# Patient Record
Sex: FEMALE | Race: WHITE | NOT HISPANIC OR LATINO | Employment: UNEMPLOYED | URBAN - METROPOLITAN AREA
[De-identification: names, ages, dates, MRNs, and addresses within clinical notes are randomized per-mention and may not be internally consistent; named-entity substitution may affect disease eponyms.]

---

## 2024-03-25 ENCOUNTER — OFFICE VISIT (OUTPATIENT)
Dept: ENDOCRINOLOGY | Facility: CLINIC | Age: 74
End: 2024-03-25
Payer: MEDICARE

## 2024-03-25 VITALS
DIASTOLIC BLOOD PRESSURE: 70 MMHG | BODY MASS INDEX: 34.16 KG/M2 | HEART RATE: 65 BPM | OXYGEN SATURATION: 99 % | SYSTOLIC BLOOD PRESSURE: 130 MMHG | WEIGHT: 174 LBS | HEIGHT: 60 IN

## 2024-03-25 DIAGNOSIS — Z78.0 POST-MENOPAUSAL: ICD-10-CM

## 2024-03-25 DIAGNOSIS — E21.0 PRIMARY HYPERPARATHYROIDISM: Primary | ICD-10-CM

## 2024-03-25 DIAGNOSIS — E83.52 HYPERCALCEMIA: ICD-10-CM

## 2024-03-25 LAB
25(OH)D3 SERPL-MCNC: 37.6 NG/ML (ref 30–100)
ALBUMIN SERPL-MCNC: 4.4 G/DL (ref 3.5–5.2)
ALBUMIN/GLOB SERPL: 1.5 G/DL
ALP SERPL-CCNC: 83 U/L (ref 39–117)
ALT SERPL W P-5'-P-CCNC: 20 U/L (ref 1–33)
ANION GAP SERPL CALCULATED.3IONS-SCNC: 10.9 MMOL/L (ref 5–15)
AST SERPL-CCNC: 18 U/L (ref 1–32)
BILIRUB SERPL-MCNC: 0.4 MG/DL (ref 0–1.2)
BUN SERPL-MCNC: 19 MG/DL (ref 8–23)
BUN/CREAT SERPL: 27.9 (ref 7–25)
CA-I BLD-MCNC: 5.8 MG/DL (ref 4.6–5.4)
CA-I SERPL ISE-MCNC: 1.45 MMOL/L (ref 1.15–1.35)
CALCIUM SPEC-SCNC: 10.8 MG/DL (ref 8.6–10.5)
CHLORIDE SERPL-SCNC: 102 MMOL/L (ref 98–107)
CO2 SERPL-SCNC: 25.1 MMOL/L (ref 22–29)
CREAT SERPL-MCNC: 0.68 MG/DL (ref 0.57–1)
EGFRCR SERPLBLD CKD-EPI 2021: 92.7 ML/MIN/1.73
GLOBULIN UR ELPH-MCNC: 2.9 GM/DL
GLUCOSE SERPL-MCNC: 132 MG/DL (ref 65–99)
POTASSIUM SERPL-SCNC: 4.1 MMOL/L (ref 3.5–5.2)
PROT SERPL-MCNC: 7.3 G/DL (ref 6–8.5)
PTH-INTACT SERPL-MCNC: 94.8 PG/ML (ref 15–65)
SODIUM SERPL-SCNC: 138 MMOL/L (ref 136–145)

## 2024-03-25 PROCEDURE — 99203 OFFICE O/P NEW LOW 30 MIN: CPT | Performed by: PHYSICIAN ASSISTANT

## 2024-03-25 PROCEDURE — 82330 ASSAY OF CALCIUM: CPT | Performed by: PHYSICIAN ASSISTANT

## 2024-03-25 PROCEDURE — 83970 ASSAY OF PARATHORMONE: CPT | Performed by: PHYSICIAN ASSISTANT

## 2024-03-25 PROCEDURE — 82306 VITAMIN D 25 HYDROXY: CPT | Performed by: PHYSICIAN ASSISTANT

## 2024-03-25 PROCEDURE — 80053 COMPREHEN METABOLIC PANEL: CPT | Performed by: PHYSICIAN ASSISTANT

## 2024-03-25 RX ORDER — ARIPIPRAZOLE 2 MG/1
1 TABLET ORAL DAILY
COMMUNITY
Start: 2024-02-24

## 2024-03-25 RX ORDER — LEVOCETIRIZINE DIHYDROCHLORIDE 5 MG/1
5 TABLET, FILM COATED ORAL EVERY EVENING
COMMUNITY
Start: 2024-02-29

## 2024-03-25 RX ORDER — LEVOTHYROXINE SODIUM 88 UG/1
88 TABLET ORAL
COMMUNITY
Start: 2023-11-01

## 2024-03-25 RX ORDER — ACYCLOVIR 400 MG/1
TABLET ORAL
COMMUNITY
Start: 2024-02-22

## 2024-03-25 RX ORDER — METFORMIN HYDROCHLORIDE 500 MG/1
500 TABLET, EXTENDED RELEASE ORAL
COMMUNITY
Start: 2024-03-11

## 2024-03-25 RX ORDER — ESCITALOPRAM OXALATE 10 MG/1
10 TABLET ORAL EVERY MORNING
COMMUNITY
Start: 2024-02-29

## 2024-03-25 NOTE — PROGRESS NOTES
Chief Complaint   Patient presents with    Abnormal Calcium     Elevated PTH       Subjective     MEGHNA Conrad is a 74 y.o. female.        History of Present Illness     Labs were checked in 11/2023 and had elevated calcium and then elevated PTH. Was not on calcium, was taking vitamin D off and on. After abnormal labs, started on vitamin D 50,000 units weekly. She is taking this regularly.    Has depression, on abilify and lexapro; struggled with it most of her life.No abdominal pain.  Denies kidney stones or fractures     Last DEXA never done per patient   No known family history of hypercalcemia     Has been on levothyroxine for hypothyroidism for years. No history of thyroid nodules.    Has diabetes and uses Dexcom. Has been on Metformin twice a day for about a year.        Current Outpatient Medications:     ARIPiprazole (ABILIFY) 2 MG tablet, Take 1 tablet by mouth Daily., Disp: , Rfl:     cholecalciferol (VITAMIN D3) 1.25 MG (24772 UT) capsule, Take 1 capsule by mouth Every 7 (Seven) Days. Patient takes every Monday, Disp: , Rfl:     Continuous Blood Gluc Sensor (Dexcom G7 Sensor) misc, , Disp: , Rfl:     escitalopram (LEXAPRO) 10 MG tablet, Take 1 tablet by mouth Every Morning., Disp: , Rfl:     levocetirizine (XYZAL) 5 MG tablet, Take 1 tablet by mouth Every Evening., Disp: , Rfl:     levothyroxine (SYNTHROID, LEVOTHROID) 88 MCG tablet, Take 1 tablet by mouth Every Morning., Disp: , Rfl:     metFORMIN ER (GLUCOPHAGE-XR) 500 MG 24 hr tablet, Take 1 tablet by mouth Daily With Breakfast., Disp: , Rfl:     metoprolol tartrate (LOPRESSOR) 25 MG tablet, Take 1 tablet by mouth Daily., Disp: , Rfl:     polyethyl glycol-propyl glycol (SYSTANE) 0.4-0.3 % solution ophthalmic solution (artificial tears), Administer 2 drops to both eyes Every 1 (One) Hour As Needed., Disp: , Rfl:      PMFSH  The following portions of the patient's history were reviewed and updated as appropriate: allergies, current medications, past  "family history, past medical history, past social history, past surgical history, and problem list.    Review of Systems   Constitutional:  Negative for fatigue.   Respiratory:  Negative for chest tightness.    Cardiovascular:  Negative for chest pain.   Gastrointestinal:  Negative for abdominal pain, constipation and diarrhea.   Musculoskeletal:  Positive for arthralgias.   Neurological:  Negative for weakness.   Psychiatric/Behavioral:  Positive for dysphoric mood. The patient is not nervous/anxious.        Objective   /70 (BP Location: Left arm, Patient Position: Sitting, Cuff Size: Adult)   Pulse 65   Ht 152.4 cm (60\")   Wt 78.9 kg (174 lb)   SpO2 99%   BMI 33.98 kg/m²     Physical Exam  Vitals and nursing note reviewed.   Constitutional:       Appearance: She is well-developed.   HENT:      Head: Normocephalic and atraumatic.   Eyes:      Conjunctiva/sclera: Conjunctivae normal.   Neck:      Thyroid: No thyroid mass, thyromegaly or thyroid tenderness.   Cardiovascular:      Rate and Rhythm: Normal rate and regular rhythm.   Pulmonary:      Effort: Pulmonary effort is normal.      Breath sounds: Normal breath sounds.   Musculoskeletal:         General: Normal range of motion.      Cervical back: Normal range of motion.   Skin:     General: Skin is warm and dry.   Psychiatric:         Behavior: Behavior normal.         2/2824  Calcium 10.9  Vitamin D, 25-hydroxy 36.2  PTH 58         ASSESSMENT/PLAN    Diagnoses and all orders for this visit:    1. Primary hyperparathyroidism (Primary)  Assessment & Plan:  Given that patient is asymptomatic and over age 55, will need to gather more information to determine the recommended management. Will recheck CMP, ionized calcium, PTH and vitamin D today.  Order DEXA scan to determine whether she has osteoporosis. Will also need 24 hour urinary calcium. If kidney function, bone density remain within normal limits, serum calcium is less than 1 point above normal " range and patient is asymptomatic, can continue to monitor levels every 6 months-1 year.    Orders:  -     dexa bone density axial; Future  -     Calcium, Urine, 24 Hour - Urine, Clean Catch; Future    2. Hypercalcemia  -     Calcium, Ionized; Future  -     PTH, Intact; Future  -     Vitamin D 25 Hydroxy; Future  -     Comprehensive Metabolic Panel; Future  -     Calcium, Ionized  -     PTH, Intact  -     Vitamin D 25 Hydroxy  -     Comprehensive Metabolic Panel    3. Post-menopausal  -     dexa bone density axial; Future             Return in about 5 months (around 8/25/2024) for Follow up.

## 2024-03-26 PROBLEM — E21.0 PRIMARY HYPERPARATHYROIDISM: Status: ACTIVE | Noted: 2024-03-26

## 2024-03-26 NOTE — ASSESSMENT & PLAN NOTE
Given that patient is asymptomatic and over age 55, will need to gather more information to determine the recommended management. Will recheck CMP, ionized calcium, PTH and vitamin D today.  Order DEXA scan to determine whether she has osteoporosis. Will also need 24 hour urinary calcium. If kidney function, bone density remain within normal limits, serum calcium is less than 1 point above normal range and patient is asymptomatic, can continue to monitor levels every 6 months-1 year.

## 2024-03-27 NOTE — PROGRESS NOTES
Your PTH level is still elevated but your calcium remains about the same.     Your vitamin D is within normal range, so please continue your current dose of vitamin D.    I have ordered the bone density scan and someone will call you to schedule the test.    We also need to collect a 24 hour urine sample to check the urine calcium. I have ordered this and you can stop by any of the [facility] labs to  the supplies for collection. Please let us know if you have any questions about this.

## 2024-04-02 ENCOUNTER — TELEPHONE (OUTPATIENT)
Dept: ENDOCRINOLOGY | Facility: CLINIC | Age: 74
End: 2024-04-02
Payer: MEDICARE

## 2024-04-02 NOTE — TELEPHONE ENCOUNTER
LVM for patient to view [employer] messages for lab results and/or contact the office for information.

## 2024-04-04 ENCOUNTER — TELEPHONE (OUTPATIENT)
Dept: ENDOCRINOLOGY | Facility: CLINIC | Age: 74
End: 2024-04-04
Payer: MEDICARE

## 2024-04-04 NOTE — TELEPHONE ENCOUNTER
SPOKE WITH [facility] REGARDING PATIENT'S RECENT LAB RESULTS. ADVISED PER CHART MESSAGE BY CARLOS QUINTERO, MAILING LAB ORDER TO PATIENT SO THAT SHE CAN HAVE THIS DONE LOCALLY.  [facility]LR[facility] VERBALIZED UNDERSTANDING AND WILL CALL WITH ANY FUTURE QUESTIONS REGARDING LAB ORDERS.

## 2024-05-28 ENCOUNTER — HOSPITAL ENCOUNTER (OUTPATIENT)
Dept: BONE DENSITY | Facility: HOSPITAL | Age: 74
Discharge: HOME | End: 2024-05-28
Admitting: PHYSICIAN ASSISTANT
Payer: MEDICARE

## 2024-05-28 DIAGNOSIS — E21.0 PRIMARY HYPERPARATHYROIDISM: ICD-10-CM

## 2024-05-28 DIAGNOSIS — Z78.0 POST-MENOPAUSAL: ICD-10-CM

## 2024-05-28 PROCEDURE — 77080 DXA BONE DENSITY AXIAL: CPT

## 2024-09-04 ENCOUNTER — OFFICE VISIT (OUTPATIENT)
Dept: ENDOCRINOLOGY | Facility: CLINIC | Age: 74
End: 2024-09-04
Payer: MEDICARE

## 2024-09-04 VITALS
DIASTOLIC BLOOD PRESSURE: 70 MMHG | HEART RATE: 94 BPM | SYSTOLIC BLOOD PRESSURE: 118 MMHG | BODY MASS INDEX: 35.14 KG/M2 | OXYGEN SATURATION: 97 % | HEIGHT: 60 IN | WEIGHT: 179 LBS

## 2024-09-04 DIAGNOSIS — E21.0 PRIMARY HYPERPARATHYROIDISM: Primary | ICD-10-CM

## 2024-09-04 PROCEDURE — 99213 OFFICE O/P EST LOW 20 MIN: CPT | Performed by: PHYSICIAN ASSISTANT

## 2024-09-04 PROCEDURE — 80053 COMPREHEN METABOLIC PANEL: CPT | Performed by: PHYSICIAN ASSISTANT

## 2024-09-04 PROCEDURE — 1160F RVW MEDS BY RX/DR IN RCRD: CPT | Performed by: PHYSICIAN ASSISTANT

## 2024-09-04 PROCEDURE — 1159F MED LIST DOCD IN RCRD: CPT | Performed by: PHYSICIAN ASSISTANT

## 2024-09-04 PROCEDURE — 83970 ASSAY OF PARATHORMONE: CPT | Performed by: PHYSICIAN ASSISTANT

## 2024-09-04 NOTE — PROGRESS NOTES
Chief Complaint   Patient presents with    Thyroid Problem     Hyperparathyroidism    Abnormal Lab     Calcium in blood       Subjective            History of Present Illness     Jami Conrad is a 74 y.o. female here for follow up on hyperparathyroidism. Since last visit, she has had DEXA that showed osteopenia. Calcium was stable at 10.8 at last visit. Vitamin D in normal range. Renal function normal. She has not yet completed the 24 hour urine calcium. Due to age and asymptomatic status, we are monitoring calcium levels. If Calcium is above 11.5 will need to consider surgical treatment.  Continues to be asymptomatic with no new fatigue or memory changes. No history of kidney stones or muscular complaints.          Current Outpatient Medications:     ARIPiprazole (ABILIFY) 2 MG tablet, Take 1 tablet by mouth Daily. Refills to [facility], Disp: , Rfl:     cholecalciferol (VITAMIN D3) 1.25 MG (82337 UT) capsule, Take 1 capsule by mouth Every 7 (Seven) Days. Patient takes every Monday, Disp: , Rfl:     Continuous Blood Gluc Sensor (Dexcom G7 Sensor) misc, , Disp: , Rfl:     escitalopram (LEXAPRO) 10 MG tablet, Take 1 tablet by mouth Every Morning., Disp: , Rfl:     levocetirizine (XYZAL) 5 MG tablet, Take 1 tablet by mouth Every Evening., Disp: , Rfl:     levothyroxine (SYNTHROID, LEVOTHROID) 88 MCG tablet, Take 1 tablet by mouth Every Morning., Disp: , Rfl:     metFORMIN ER (GLUCOPHAGE-XR) 500 MG 24 hr tablet, Take 1 tablet by mouth Daily With Breakfast., Disp: , Rfl:     metoprolol tartrate (LOPRESSOR) 25 MG tablet, Take 1 tablet by mouth Daily., Disp: , Rfl:     polyethyl glycol-propyl glycol (SYSTANE) 0.4-0.3 % solution ophthalmic solution (artificial tears), Administer 2 drops to both eyes Every 1 (One) Hour As Needed., Disp: , Rfl:      PMFSH  The following portions of the patient's history were reviewed and updated as appropriate: allergies, current medications, past family history, past medical  "history, past social history, past surgical history, and problem list.    Review of Systems   Constitutional:  Negative for activity change, appetite change and fatigue.   Respiratory:  Negative for chest tightness and shortness of breath.    Gastrointestinal:  Negative for abdominal pain.   Musculoskeletal:  Negative for myalgias.   Neurological:  Negative for numbness.   Psychiatric/Behavioral:  The patient is not nervous/anxious.        Objective   /70 (BP Location: Left arm, Patient Position: Sitting, Cuff Size: Adult)   Pulse 94   Ht 152.4 cm (60\")   Wt 81.2 kg (179 lb)   SpO2 97%   BMI 34.96 kg/m²     Physical Exam  Vitals and nursing note reviewed.   Constitutional:       Appearance: She is well-developed.   HENT:      Head: Normocephalic and atraumatic.   Eyes:      Conjunctiva/sclera: Conjunctivae normal.   Neck:      Thyroid: No thyroid mass, thyromegaly or thyroid tenderness.   Cardiovascular:      Rate and Rhythm: Normal rate and regular rhythm.   Pulmonary:      Effort: Pulmonary effort is normal.      Breath sounds: Normal breath sounds.   Musculoskeletal:         General: Normal range of motion.      Cervical back: Normal range of motion.   Skin:     General: Skin is warm and dry.   Psychiatric:         Behavior: Behavior normal.              ASSESSMENT/PLAN    Diagnoses and all orders for this visit:    1. Primary hyperparathyroidism (Primary)  Assessment & Plan:  Currently patient is asymptomatic with no history of osteoporosis, kidney stones, normal renal function, age >50 and calcium less that 1 mg/dL greater than normal range. 24 hour urine calcium is pending, gave patient order so she can do it at a local lab. Plan to continue monitoring unless something changes. Check calcium level today. Discussed that recommended treatment is parathyroidectomy. Further recommendations based on results.      Orders:  -     Comprehensive Metabolic Panel; Future  -     PTH, Intact & Calcium; Future  - "     Comprehensive Metabolic Panel  -     PTH, Intact & Calcium             Return in about 6 months (around 3/4/2025) for Follow up.    Portions of this note were completed with voice recognition program.    Electronically signed by NEDRA Orozco

## 2024-09-05 LAB
ALBUMIN SERPL-MCNC: 4.3 G/DL (ref 3.5–5.2)
ALBUMIN/GLOB SERPL: 1.5 G/DL
ALP SERPL-CCNC: 80 U/L (ref 39–117)
ALT SERPL W P-5'-P-CCNC: 18 U/L (ref 1–33)
ANION GAP SERPL CALCULATED.3IONS-SCNC: 11 MMOL/L (ref 5–15)
AST SERPL-CCNC: 19 U/L (ref 1–32)
BILIRUB SERPL-MCNC: 0.3 MG/DL (ref 0–1.2)
BUN SERPL-MCNC: 14 MG/DL (ref 8–23)
BUN/CREAT SERPL: 24.6 (ref 7–25)
CALCIUM SPEC-SCNC: 11.4 MG/DL (ref 8.6–10.5)
CALCIUM SPEC-SCNC: 11.5 MG/DL (ref 8.6–10.5)
CHLORIDE SERPL-SCNC: 102 MMOL/L (ref 98–107)
CO2 SERPL-SCNC: 26 MMOL/L (ref 22–29)
CREAT SERPL-MCNC: 0.57 MG/DL (ref 0.57–1)
EGFRCR SERPLBLD CKD-EPI 2021: 96.7 ML/MIN/1.73
GLOBULIN UR ELPH-MCNC: 2.8 GM/DL
GLUCOSE SERPL-MCNC: 141 MG/DL (ref 65–99)
POTASSIUM SERPL-SCNC: 4.3 MMOL/L (ref 3.5–5.2)
PROT SERPL-MCNC: 7.1 G/DL (ref 6–8.5)
PTH-INTACT SERPL-MCNC: 85.1 PG/ML (ref 15–65)
SODIUM SERPL-SCNC: 139 MMOL/L (ref 136–145)

## 2024-09-08 NOTE — ASSESSMENT & PLAN NOTE
Currently patient is asymptomatic with no history of osteoporosis, kidney stones, normal renal function, age >50 and calcium less that 1 mg/dL greater than normal range. 24 hour urine calcium is pending, gave patient order so she can do it at a local lab. Plan to continue monitoring unless something changes. Check calcium level today. Discussed that recommended treatment is parathyroidectomy. Further recommendations based on results.

## 2024-09-13 ENCOUNTER — TELEPHONE (OUTPATIENT)
Dept: ENDOCRINOLOGY | Facility: CLINIC | Age: 74
End: 2024-09-13
Payer: MEDICARE

## 2024-09-13 DIAGNOSIS — E21.0 PRIMARY HYPERPARATHYROIDISM: Primary | ICD-10-CM

## 2024-11-25 ENCOUNTER — PRE-ADMISSION TESTING (OUTPATIENT)
Dept: PREADMISSION TESTING | Facility: HOSPITAL | Age: 74
End: 2024-11-25
Payer: MEDICARE

## 2024-11-25 VITALS — HEIGHT: 60 IN | BODY MASS INDEX: 34.89 KG/M2 | WEIGHT: 177.69 LBS

## 2024-11-25 LAB
DEPRECATED RDW RBC AUTO: 44.2 FL (ref 37–54)
ERYTHROCYTE [DISTWIDTH] IN BLOOD BY AUTOMATED COUNT: 12.6 % (ref 12.3–15.4)
HCT VFR BLD AUTO: 41.3 % (ref 34–46.6)
HGB BLD-MCNC: 13.8 G/DL (ref 12–15.9)
MCH RBC QN AUTO: 31.7 PG (ref 26.6–33)
MCHC RBC AUTO-ENTMCNC: 33.4 G/DL (ref 31.5–35.7)
MCV RBC AUTO: 94.7 FL (ref 79–97)
PLATELET # BLD AUTO: 289 10*3/MM3 (ref 140–450)
PMV BLD AUTO: 9.8 FL (ref 6–12)
POTASSIUM SERPL-SCNC: 4.7 MMOL/L (ref 3.5–5.2)
RBC # BLD AUTO: 4.36 10*6/MM3 (ref 3.77–5.28)
WBC NRBC COR # BLD AUTO: 8.61 10*3/MM3 (ref 3.4–10.8)

## 2024-11-25 PROCEDURE — 84132 ASSAY OF SERUM POTASSIUM: CPT

## 2024-11-25 PROCEDURE — 85027 COMPLETE CBC AUTOMATED: CPT

## 2024-11-25 PROCEDURE — 36415 COLL VENOUS BLD VENIPUNCTURE: CPT

## 2024-11-25 PROCEDURE — 93005 ELECTROCARDIOGRAM TRACING: CPT

## 2024-11-25 NOTE — PAT
Patient to apply Chlorhexadine wipes  to surgical area (as instructed) the night before procedure and the AM of procedure. Wipes provided.    Patient viewed general PAT education video as instructed in their preoperative information received from their surgeon.  Patient stated the general PAT education video was viewed in its entirety and survey completed.  Copies of PAT general education handouts (Incentive Spirometry, Meds to Beds Program, Patient Belongings, Pre-op skin preparation instructions, Blood Glucose testing, Visitor policy, Surgery FAQ, Code H) distributed to patient if not printed. Education related to the PAT pass and skin preparation for surgery (if applicable) completed in PAT as a reinforcement to PAT education video. Patient instructed to return PAT pass provided today as well as completed skin preparation sheet (if applicable) on the day of procedure.     Additionally if patient had not viewed video yet but intended to view it at home or in our waiting area, then referred them to the handout with QR code/link provided during PAT visit.  Encouraged patient/family to read PAT general education handouts thoroughly and notify PAT staff with any questions or concerns. Patient verbalized understanding of all information and priority content.  '

## 2024-11-26 LAB
QT INTERVAL: 382 MS
QTC INTERVAL: 451 MS

## 2024-12-04 ENCOUNTER — ANESTHESIA EVENT (OUTPATIENT)
Dept: PERIOP | Facility: HOSPITAL | Age: 74
End: 2024-12-04
Payer: MEDICARE

## 2024-12-05 ENCOUNTER — ANESTHESIA (OUTPATIENT)
Dept: PERIOP | Facility: HOSPITAL | Age: 74
End: 2024-12-05
Payer: MEDICARE

## 2024-12-05 ENCOUNTER — HOSPITAL ENCOUNTER (OUTPATIENT)
Facility: HOSPITAL | Age: 74
Setting detail: HOSPITAL OUTPATIENT SURGERY
Discharge: HOME | End: 2024-12-05
Attending: SURGERY | Admitting: SURGERY
Payer: MEDICARE

## 2024-12-05 VITALS
HEART RATE: 56 BPM | BODY MASS INDEX: 34.75 KG/M2 | TEMPERATURE: 98.5 F | HEIGHT: 60 IN | OXYGEN SATURATION: 97 % | SYSTOLIC BLOOD PRESSURE: 140 MMHG | WEIGHT: 177 LBS | RESPIRATION RATE: 22 BRPM | DIASTOLIC BLOOD PRESSURE: 78 MMHG

## 2024-12-05 DIAGNOSIS — E21.3 HYPERPARATHYROIDISM: ICD-10-CM

## 2024-12-05 DIAGNOSIS — E21.0 PRIMARY HYPERPARATHYROIDISM: Primary | ICD-10-CM

## 2024-12-05 LAB — GLUCOSE BLDC GLUCOMTR-MCNC: 166 MG/DL (ref 70–130)

## 2024-12-05 PROCEDURE — 88305 TISSUE EXAM BY PATHOLOGIST: CPT | Performed by: SURGERY

## 2024-12-05 PROCEDURE — 25010000002 LIDOCAINE PF 1% 1 % SOLUTION: Performed by: ANESTHESIOLOGY

## 2024-12-05 PROCEDURE — 82948 REAGENT STRIP/BLOOD GLUCOSE: CPT

## 2024-12-05 PROCEDURE — 25010000002 DEXAMETHASONE PER 1 MG: Performed by: NURSE ANESTHETIST, CERTIFIED REGISTERED

## 2024-12-05 PROCEDURE — 88331 PATH CONSLTJ SURG 1 BLK 1SPC: CPT | Performed by: SURGERY

## 2024-12-05 PROCEDURE — 25010000002 ONDANSETRON PER 1 MG: Performed by: NURSE ANESTHETIST, CERTIFIED REGISTERED

## 2024-12-05 PROCEDURE — 25010000002 SUGAMMADEX 200 MG/2ML SOLUTION: Performed by: NURSE ANESTHETIST, CERTIFIED REGISTERED

## 2024-12-05 PROCEDURE — 25010000002 PROPOFOL 10 MG/ML EMULSION: Performed by: NURSE ANESTHETIST, CERTIFIED REGISTERED

## 2024-12-05 PROCEDURE — 25010000002 LIDOCAINE PF 1% 1 % SOLUTION: Performed by: NURSE ANESTHETIST, CERTIFIED REGISTERED

## 2024-12-05 PROCEDURE — 25010000002 PHENYLEPHRINE 10 MG/ML SOLUTION 1 ML VIAL: Performed by: NURSE ANESTHETIST, CERTIFIED REGISTERED

## 2024-12-05 PROCEDURE — S0260 H&P FOR SURGERY: HCPCS | Performed by: PHYSICIAN ASSISTANT

## 2024-12-05 PROCEDURE — 60500 EXPLORE PARATHYROID GLANDS: CPT | Performed by: PHYSICIAN ASSISTANT

## 2024-12-05 PROCEDURE — 25810000003 LACTATED RINGERS PER 1000 ML: Performed by: ANESTHESIOLOGY

## 2024-12-05 PROCEDURE — 25010000002 CEFAZOLIN PER 500 MG: Performed by: SURGERY

## 2024-12-05 PROCEDURE — 25010000002 FENTANYL CITRATE (PF) 100 MCG/2ML SOLUTION: Performed by: NURSE ANESTHETIST, CERTIFIED REGISTERED

## 2024-12-05 DEVICE — ABSORBABLE HEMOSTAT (OXIDIZED REGENERATED CELLULOSE)
Type: IMPLANTABLE DEVICE | Site: NECK | Status: FUNCTIONAL
Brand: SURGICEL

## 2024-12-05 DEVICE — CLIP LIGAT VASC HORIZON TI MD BLU 6CT: Type: IMPLANTABLE DEVICE | Site: NECK | Status: FUNCTIONAL

## 2024-12-05 DEVICE — CLIP LIGAT VASC HORIZON TI SM YEL 6CT: Type: IMPLANTABLE DEVICE | Site: NECK | Status: FUNCTIONAL

## 2024-12-05 RX ORDER — OXYCODONE AND ACETAMINOPHEN 5; 325 MG/1; MG/1
TABLET ORAL
Status: COMPLETED
Start: 2024-12-05 | End: 2024-12-05

## 2024-12-05 RX ORDER — PROPOFOL 10 MG/ML
VIAL (ML) INTRAVENOUS AS NEEDED
Status: DISCONTINUED | OUTPATIENT
Start: 2024-12-05 | End: 2024-12-05 | Stop reason: SURG

## 2024-12-05 RX ORDER — SODIUM CHLORIDE, SODIUM LACTATE, POTASSIUM CHLORIDE, CALCIUM CHLORIDE 600; 310; 30; 20 MG/100ML; MG/100ML; MG/100ML; MG/100ML
9 INJECTION, SOLUTION INTRAVENOUS CONTINUOUS
Status: DISCONTINUED | OUTPATIENT
Start: 2024-12-06 | End: 2024-12-05 | Stop reason: HOSPADM

## 2024-12-05 RX ORDER — OXYCODONE AND ACETAMINOPHEN 5; 325 MG/1; MG/1
1 TABLET ORAL ONCE AS NEEDED
Status: DISCONTINUED | OUTPATIENT
Start: 2024-12-05 | End: 2024-12-05 | Stop reason: HOSPADM

## 2024-12-05 RX ORDER — LIDOCAINE HYDROCHLORIDE 10 MG/ML
INJECTION, SOLUTION EPIDURAL; INFILTRATION; INTRACAUDAL; PERINEURAL AS NEEDED
Status: DISCONTINUED | OUTPATIENT
Start: 2024-12-05 | End: 2024-12-05 | Stop reason: SURG

## 2024-12-05 RX ORDER — ROCURONIUM BROMIDE 10 MG/ML
INJECTION, SOLUTION INTRAVENOUS AS NEEDED
Status: DISCONTINUED | OUTPATIENT
Start: 2024-12-05 | End: 2024-12-05 | Stop reason: SURG

## 2024-12-05 RX ORDER — DOCUSATE SODIUM 100 MG/1
100 CAPSULE, LIQUID FILLED ORAL 2 TIMES DAILY PRN
Qty: 30 CAPSULE | Refills: 1 | Status: SHIPPED | OUTPATIENT
Start: 2024-12-05 | End: 2025-12-05

## 2024-12-05 RX ORDER — SODIUM CHLORIDE 0.9 % (FLUSH) 0.9 %
10 SYRINGE (ML) INJECTION EVERY 12 HOURS SCHEDULED
Status: DISCONTINUED | OUTPATIENT
Start: 2024-12-05 | End: 2024-12-05 | Stop reason: HOSPADM

## 2024-12-05 RX ORDER — SUCCINYLCHOLINE/SOD CL,ISO/PF 200MG/10ML
SYRINGE (ML) INTRAVENOUS AS NEEDED
Status: DISCONTINUED | OUTPATIENT
Start: 2024-12-05 | End: 2024-12-05 | Stop reason: SURG

## 2024-12-05 RX ORDER — DROPERIDOL 2.5 MG/ML
0.62 INJECTION, SOLUTION INTRAMUSCULAR; INTRAVENOUS ONCE AS NEEDED
Status: DISCONTINUED | OUTPATIENT
Start: 2024-12-05 | End: 2024-12-05 | Stop reason: HOSPADM

## 2024-12-05 RX ORDER — CALCIUM CARBONATE 500 MG/1
2 TABLET, CHEWABLE ORAL 2 TIMES DAILY
Qty: 90 TABLET | Refills: 3 | Status: SHIPPED | OUTPATIENT
Start: 2024-12-05 | End: 2025-03-05

## 2024-12-05 RX ORDER — OXYCODONE AND ACETAMINOPHEN 5; 325 MG/1; MG/1
1 TABLET ORAL ONCE
Status: COMPLETED | OUTPATIENT
Start: 2024-12-05 | End: 2024-12-05

## 2024-12-05 RX ORDER — HYDROMORPHONE HYDROCHLORIDE 1 MG/ML
0.5 INJECTION, SOLUTION INTRAMUSCULAR; INTRAVENOUS; SUBCUTANEOUS
Status: DISCONTINUED | OUTPATIENT
Start: 2024-12-05 | End: 2024-12-05 | Stop reason: HOSPADM

## 2024-12-05 RX ORDER — SODIUM CHLORIDE 0.9 % (FLUSH) 0.9 %
10 SYRINGE (ML) INJECTION AS NEEDED
Status: DISCONTINUED | OUTPATIENT
Start: 2024-12-05 | End: 2024-12-05 | Stop reason: HOSPADM

## 2024-12-05 RX ORDER — OXYCODONE AND ACETAMINOPHEN 5; 325 MG/1; MG/1
1 TABLET ORAL EVERY 6 HOURS PRN
Qty: 10 TABLET | Refills: 0 | Status: SHIPPED | OUTPATIENT
Start: 2024-12-05

## 2024-12-05 RX ORDER — FENTANYL CITRATE 50 UG/ML
INJECTION, SOLUTION INTRAMUSCULAR; INTRAVENOUS AS NEEDED
Status: DISCONTINUED | OUTPATIENT
Start: 2024-12-05 | End: 2024-12-05 | Stop reason: SURG

## 2024-12-05 RX ORDER — ONDANSETRON 2 MG/ML
INJECTION INTRAMUSCULAR; INTRAVENOUS AS NEEDED
Status: DISCONTINUED | OUTPATIENT
Start: 2024-12-05 | End: 2024-12-05 | Stop reason: SURG

## 2024-12-05 RX ORDER — FAMOTIDINE 10 MG/ML
20 INJECTION, SOLUTION INTRAVENOUS ONCE
Status: DISCONTINUED | OUTPATIENT
Start: 2024-12-05 | End: 2024-12-05 | Stop reason: HOSPADM

## 2024-12-05 RX ORDER — MIDAZOLAM HYDROCHLORIDE 1 MG/ML
0.5 INJECTION, SOLUTION INTRAMUSCULAR; INTRAVENOUS
Status: DISCONTINUED | OUTPATIENT
Start: 2024-12-05 | End: 2024-12-05 | Stop reason: HOSPADM

## 2024-12-05 RX ORDER — BUPIVACAINE HYDROCHLORIDE AND EPINEPHRINE 2.5; 5 MG/ML; UG/ML
INJECTION, SOLUTION INFILTRATION; PERINEURAL AS NEEDED
Status: DISCONTINUED | OUTPATIENT
Start: 2024-12-05 | End: 2024-12-05 | Stop reason: HOSPADM

## 2024-12-05 RX ORDER — ONDANSETRON 2 MG/ML
4 INJECTION INTRAMUSCULAR; INTRAVENOUS ONCE AS NEEDED
Status: DISCONTINUED | OUTPATIENT
Start: 2024-12-05 | End: 2024-12-05 | Stop reason: HOSPADM

## 2024-12-05 RX ORDER — EPHEDRINE SULFATE 50 MG/ML
INJECTION INTRAVENOUS AS NEEDED
Status: DISCONTINUED | OUTPATIENT
Start: 2024-12-05 | End: 2024-12-05 | Stop reason: SURG

## 2024-12-05 RX ORDER — LIDOCAINE HYDROCHLORIDE 10 MG/ML
0.5 INJECTION, SOLUTION EPIDURAL; INFILTRATION; INTRACAUDAL; PERINEURAL ONCE AS NEEDED
Status: COMPLETED | OUTPATIENT
Start: 2024-12-05 | End: 2024-12-05

## 2024-12-05 RX ORDER — FENTANYL CITRATE 50 UG/ML
50 INJECTION, SOLUTION INTRAMUSCULAR; INTRAVENOUS
Status: DISCONTINUED | OUTPATIENT
Start: 2024-12-05 | End: 2024-12-05 | Stop reason: HOSPADM

## 2024-12-05 RX ORDER — FAMOTIDINE 20 MG/1
20 TABLET, FILM COATED ORAL ONCE
Status: COMPLETED | OUTPATIENT
Start: 2024-12-05 | End: 2024-12-05

## 2024-12-05 RX ORDER — DEXAMETHASONE SODIUM PHOSPHATE 10 MG/ML
INJECTION INTRAMUSCULAR; INTRAVENOUS AS NEEDED
Status: DISCONTINUED | OUTPATIENT
Start: 2024-12-05 | End: 2024-12-05 | Stop reason: SURG

## 2024-12-05 RX ADMIN — PHENYLEPHRINE HYDROCHLORIDE 0.5 MCG/KG/MIN: 10 INJECTION INTRAVENOUS at 07:48

## 2024-12-05 RX ADMIN — SODIUM CHLORIDE, POTASSIUM CHLORIDE, SODIUM LACTATE AND CALCIUM CHLORIDE 9 ML/HR: 600; 310; 30; 20 INJECTION, SOLUTION INTRAVENOUS at 06:55

## 2024-12-05 RX ADMIN — OXYCODONE HYDROCHLORIDE AND ACETAMINOPHEN 1 TABLET: 5; 325 TABLET ORAL at 12:07

## 2024-12-05 RX ADMIN — SUGAMMADEX 200 MG: 100 INJECTION, SOLUTION INTRAVENOUS at 08:31

## 2024-12-05 RX ADMIN — FENTANYL CITRATE 50 MCG: 50 INJECTION, SOLUTION INTRAMUSCULAR; INTRAVENOUS at 07:34

## 2024-12-05 RX ADMIN — PROPOFOL 150 MG: 10 INJECTION, EMULSION INTRAVENOUS at 07:34

## 2024-12-05 RX ADMIN — ROCURONIUM BROMIDE 10 MG: 10 INJECTION INTRAVENOUS at 07:34

## 2024-12-05 RX ADMIN — LIDOCAINE HYDROCHLORIDE 50 MG: 10 INJECTION, SOLUTION EPIDURAL; INFILTRATION; INTRACAUDAL; PERINEURAL at 07:34

## 2024-12-05 RX ADMIN — Medication 100 MG: at 07:34

## 2024-12-05 RX ADMIN — EPHEDRINE SULFATE 10 MG: 50 INJECTION INTRAVENOUS at 07:48

## 2024-12-05 RX ADMIN — LIDOCAINE HYDROCHLORIDE 0.5 ML: 10 INJECTION, SOLUTION EPIDURAL; INFILTRATION; INTRACAUDAL; PERINEURAL at 06:55

## 2024-12-05 RX ADMIN — PROPOFOL 50 MG: 10 INJECTION, EMULSION INTRAVENOUS at 08:07

## 2024-12-05 RX ADMIN — SODIUM CHLORIDE 2000 MG: 900 INJECTION INTRAVENOUS at 07:28

## 2024-12-05 RX ADMIN — EPHEDRINE SULFATE 10 MG: 50 INJECTION INTRAVENOUS at 07:47

## 2024-12-05 RX ADMIN — FAMOTIDINE 20 MG: 20 TABLET, FILM COATED ORAL at 07:14

## 2024-12-05 RX ADMIN — OXYCODONE AND ACETAMINOPHEN 1 TABLET: 5; 325 TABLET ORAL at 12:07

## 2024-12-05 RX ADMIN — DEXAMETHASONE SODIUM PHOSPHATE 8 MG: 10 INJECTION INTRAMUSCULAR; INTRAVENOUS at 08:59

## 2024-12-05 RX ADMIN — FENTANYL CITRATE 50 MCG: 50 INJECTION, SOLUTION INTRAMUSCULAR; INTRAVENOUS at 07:43

## 2024-12-05 RX ADMIN — ONDANSETRON 4 MG: 2 INJECTION INTRAMUSCULAR; INTRAVENOUS at 08:25

## 2024-12-05 NOTE — ANESTHESIA POSTPROCEDURE EVALUATION
Patient: Jami Conrad    Procedure Summary       Date: 12/05/24 Room / Location:  [facility]    Anesthesia Start: 0728 Anesthesia Stop: 0847    Procedure: PARATHYROIDECTOMY (Neck) Diagnosis: Hyperparathyroidism, primary    Surgeons: Valentin Hayward MD Provider: Raphael Bernard MD    Anesthesia Type: general ASA Status: 3            Anesthesia Type: general    Vitals  Vitals Value Taken Time   /57 12/05/24 0847   Temp 97 °F (36.1 °C) 12/05/24 0847   Pulse 87 12/05/24 0847   Resp 18 12/05/24 0847   SpO2 96 % 12/05/24 0847           Post Anesthesia Care and Evaluation    Patient location during evaluation: PACU  Patient participation: complete - patient participated  Level of consciousness: awake and alert  Pain management: adequate    Airway patency: patent  Anesthetic complications: No anesthetic complications  PONV Status: none  Cardiovascular status: hemodynamically stable and acceptable  Respiratory status: nonlabored ventilation, acceptable and nasal cannula  Hydration status: acceptable

## 2024-12-05 NOTE — ANESTHESIA PROCEDURE NOTES
Airway  Urgency: elective    Date/Time: 12/5/2024 7:35 AM  Airway not difficult    General Information and Staff    Patient location during procedure: OR  CRNA/CAA: Rochelle Tovar CRNA    Indications and Patient Condition  Indications for airway management: airway protection    Preoxygenated: yes  MILS not maintained throughout  Mask difficulty assessment: 1 - vent by mask    Final Airway Details  Final airway type: endotracheal airway      Successful airway: ETT  Cuffed: yes   Successful intubation technique: video laryngoscopy  Endotracheal tube insertion site: oral  Blade: Sun  Blade size: 3  ETT size (mm): 7.0  Cormack-Lehane Classification: grade IIa - partial view of glottis  Placement verified by: chest auscultation and capnometry   Measured from: lips  ETT/EBT  to lips (cm): 21  Number of attempts at approach: 1  Assessment: lips, teeth, and gum same as pre-op and atraumatic intubation    Additional Comments  Negative epigastric sounds, Breath sound equal bilaterally with symmetric chest rise and fall

## 2024-12-05 NOTE — OP NOTE
General Surgery Operative Note    PARATHYROIDECTOMY  Procedure Report    Patient Name:  Jami Conrad  YOB: 1950  [id number]     Date of Surgery:  12/5/2024       Pre-op Diagnosis: Primary hyperparathyroidism    Post-op Diagnosis: Primary hyperparathyroidism    Procedure(s):  PARATHYROIDECTOMY    Surgeon: Valentin Hayward MD    Assistant: Bautista Cee PA     Anesthesia: General         Estimated Blood Loss: minimal    Complications: None     Implants:    Implant Name Type Inv. Item Serial No.  Lot No. LRB No. Used Action   CLIP LIGAT VASC HORIZON TI MD STEPHANIE 6CT - [id number] Implant CLIP LIGAT VASC HORIZON TI MD STEPHANIE 6CT  [employer]  N/A 1 Implanted   CLIP LIGAT VASC HORIZON TI SM YEL 6CT - [id number] Implant CLIP LIGAT VASC HORIZON TI SM YEL 6CT  [employer]  N/A 1 Implanted   HEMOST ABS SURGICEL ORIG 4X8IN STRL - [id number] Implant HEMOST ABS SURGICEL ORIG 4X8IN STRL  [employer]  [employer] J [employer] J  N/A 1 Implanted       Specimen:          Specimens       ID Source Type Tests Collected By Collected At Frozen?    A Parathyroid Gland Tissue TISSUE PATHOLOGY EXAM   Valentin Hayward MD 12/5/24 0823 Yes    Description: LEFT INFERIOR PARATHYROID FS                Findings: Enlarged and adenomatous left inferior parathyroid gland, confirmed by frozen section; no other enlarged parathyroid tissue in the central neck    Indications: The patient is a 74-year-old female with a history of elevated serum calcium level and elevated PTH level, consistent with primary hyperparathyroidism.  We discussed the risks, benefits, and alternatives to parathyroidectomy and the patient was agreeable.    Description of Procedure:     After obtaining informed consent, the patient was taken to the operating room and placed in supine position. After appropriate DVT and antibiotic prophylaxis, general anesthesia was induced with placement of a NIM tube for nerve monitoring. The neck was prepped and  draped in standard sterile fashion.    An approximately 6 cm incision was made 2 fingerbreadths superior to the suprasternal notch transversely across the midline neck.  The skin was incised using a 15 blade.  The dermis and subcutaneous tissue were divided using Bovie electrocautery.  The platysma was dissected out and divided using Bovie electrocautery.  Subplatysmal flaps were created superiorly to the thyroid cartilage, inferiorly to the sternal notch, and laterally to the sternocleidomastoid muscles.  The strap muscles were divided in the midline at the median raphae.  Attention was paid to the right neck.  The strap muscles were dissected off the anterior and lateral surfaces of the thyroid lobe using Bovie electrocautery.  Babcocks were placed on the thyroid lobe and it was retracted anteriorly and medially.  A grossly normal-appearing right superior and right inferior parathyroid gland were identified.    Attention was then paid to the left neck. The strap muscles were dissected off the anterior and lateral surfaces of the thyroid lobe using Bovie electrocautery.  Babcocks were placed on the thyroid lobe and it was retracted anteriorly and medially.  An enlarged and adenomatous left inferior parathyroid gland was identified.  This was circumferentially dissected away from surrounding structures using bipolar electrocautery and feeding vessels ligated with clips.  This was sent for frozen section evaluation and was consistent with hypercellular parathyroid tissue.  No additional abnormal parathyroid tissue was noted in the central neck.    Valsalva maneuver was performed by anesthesia to assess for any further bleeding and any further bleeding was controlled using placement of clips.  After hemostasis was obtained the left and right recurrent laryngeal nerves were grossly intact throughout the entire visualized course and stimulated appropriately with nerve stimulation.  Surgicel was placed in all areas of  dissection.  The strap muscles were loosely reapproximated using interrupted 3-0 Vicryl.  The platysma was reapproximated using interrupted 3-0 Vicryl.  The skin was reapproximated using a running subcuticular 4-0 Monocryl.  A dry dressing was placed on top of this.  The patient awoke from anesthesia without complications and was taken to the PACU in stable condition.    Assistant: Bautista Cee PA  was responsible for performing the following activities: Retraction, Suction, Suturing, Closing, and Placing Dressing and their skilled assistance was necessary for the success of this case.    Valentin Hayward MD     Date: 12/5/2024  Time: 08:43 EST

## 2024-12-05 NOTE — H&P
"Pre-Op H&P  Jami Conrad  [id number]  1950    Chief complaint: \"My calcium is high\"    HPI:    Patient is a 74 y.o.female who presents with hypercalcemia with osteopenia.  Found to have hyperparathyroidism.  Patient is now admitted for parathyroidectomy.    Review of Systems:  General ROS: negative for chills, fever or skin lesions;  No changes since last office visit.  Neg for recent sick exposure  Cardiovascular ROS: no chest pain or dyspnea on exertion  Respiratory ROS: no cough, shortness of breath, or wheezing    Allergies:   Allergies   Allergen Reactions    Tetracyclines & Related Hives and Itching     Hives and itching       Home Meds:    No current facility-administered medications on file prior to encounter.     Current Outpatient Medications on File Prior to Encounter   Medication Sig Dispense Refill    ARIPiprazole (ABILIFY) 2 MG tablet Take 1 tablet by mouth Daily. Refills to [facility]      cholecalciferol (VITAMIN D3) 1.25 MG (72798 UT) capsule Take 1 capsule by mouth Every 7 (Seven) Days. Patient takes every Monday      Continuous Blood Gluc Sensor (Dexcom G7 Sensor) misc       escitalopram (LEXAPRO) 10 MG tablet Take 1 tablet by mouth Every Morning.      levocetirizine (XYZAL) 5 MG tablet Take 1 tablet by mouth Every Evening.      levothyroxine (SYNTHROID, LEVOTHROID) 88 MCG tablet Take 1 tablet by mouth Every Morning.      metFORMIN ER (GLUCOPHAGE-XR) 500 MG 24 hr tablet Take 1 tablet by mouth Every Evening.      metoprolol tartrate (LOPRESSOR) 25 MG tablet Take 1 tablet by mouth Every Night.      polyethyl glycol-propyl glycol (SYSTANE) 0.4-0.3 % solution ophthalmic solution (artificial tears) Administer 2 drops to both eyes 2 (Two) Times a Day.         PMH:   Past Medical History:   Diagnosis Date    Blind right eye     congenital    Diabetes mellitus     Disease of thyroid gland     Glaucoma     Hyperparathyroidism     Hypertension     Microcephaly     Seasonal allergies  "     PSH:    Past Surgical History:   Procedure Laterality Date    COLONOSCOPY      HYSTERECTOMY      REPLACEMENT TOTAL KNEE Bilateral     left with valentine, right knee does not have a valentine    SINUS SURGERY N/A     TONSILLECTOMY N/A     VARICOSE VEIN SURGERY Left     WISDOM TOOTH EXTRACTION       Patient denies allergy to contrast dye or latex  Immunization History:  Influenza: Yes  Pneumococcal: No  Tetanus: Up-to-date    Social History:   Tobacco:   Social History     Tobacco Use   Smoking Status Former    Types: Cigarettes   Smokeless Tobacco Never   Tobacco Comments    Quit 1997, smoked 40 years, 2ppd      Alcohol:     Social History     Substance and Sexual Activity   Alcohol Use Yes    Comment: occasional       Vitals:           There were no vitals taken for this visit.    Physical Exam:  General Appearance:    Alert, cooperative, no distress, appears stated age   Head:    Normocephalic, without obvious abnormality, atraumatic   Lungs:   Clear to auscultation bilaterally to the bases    Heart: S1-S2 without rubs murmurs or gallops    Abdomen:  Soft, nontender, bowel sounds present throughout.   Breast Exam:    deferred   Genitalia:    deferred   Extremities:   Extremities normal, atraumatic, no cyanosis or edema   Skin:   Skin color, texture, turgor normal, no rashes or lesions   Neurologic:   Grossly intact   Results Review  LABS:  Lab Results   Component Value Date    WBC 8.61 11/25/2024    HGB 13.8 11/25/2024    HCT 41.3 11/25/2024    MCV 94.7 11/25/2024     11/25/2024    GLUCOSE 141 (H) 09/04/2024    BUN 14 09/04/2024    CREATININE 0.57 09/04/2024     09/04/2024    K 4.7 11/25/2024     09/04/2024    CO2 26.0 09/04/2024    CALCIUM 11.4 (H) 09/04/2024    CALCIUM 11.5 (H) 09/04/2024    ALBUMIN 4.3 09/04/2024    AST 19 09/04/2024    ALT 18 09/04/2024    BILITOT 0.3 09/04/2024       RADIOLOGY:  No radiology results for the last 3 days     I reviewed the patient's new clinical results.    Cancer  Staging (if applicable)  Cancer Patient: __ yes __no __unknown; If yes, clinical stage T:__ N:__M:__, stage group or __N/A    Impression: Primary hyperparathyroidism  Obesity  Congenital blind right eye  Diabetes mellitus  Hypertension  Hypothyroidism on replacement therapy    Plan: Parathyroidectomy.      NEDRA Herrera   12/05/24   6:55 AM EST

## 2024-12-05 NOTE — ANESTHESIA PREPROCEDURE EVALUATION
Anesthesia Evaluation     Patient summary reviewed and Nursing notes reviewed   no history of anesthetic complications:   NPO Solid Status: > 8 hours  NPO Liquid Status: > 2 hours           Airway   Mallampati: II  TM distance: >3 FB  Neck ROM: full  No difficulty expected  Dental - normal exam     Pulmonary - negative pulmonary ROS and normal exam    breath sounds clear to auscultation  Cardiovascular - normal exam    ECG reviewed  Rhythm: regular  Rate: normal    (+) hypertension      Neuro/Psych- negative ROS  GI/Hepatic/Renal/Endo    (+) obesity, diabetes mellitus type 2, thyroid problem hypothyroidism    ROS Comment: Parathyroid adenoma    Musculoskeletal     Abdominal    Substance History      OB/GYN          Other                    Anesthesia Plan    ASA 3     general     intravenous induction     Anesthetic plan, risks, benefits, and alternatives have been provided, discussed and informed consent has been obtained with: patient.    Plan discussed with CRNA.    CODE STATUS:

## 2024-12-05 NOTE — BRIEF OP NOTE
PARATHYROIDECTOMY  Progress Note    Jami Conrad  12/5/2024    Pre-op Diagnosis:      * Hyperparathyroidism, primary        Post-Op Diagnosis Codes:     * Hyperparathyroidism, primary     Procedure/CPT® Codes:        Procedure(s):  PARATHYROIDECTOMY              Surgeon(s):  Valentin Hayward MD    Anesthesia: General    Staff:   Circulator: Roxana Greenberg RN  Scrub Person: Cuauhtemoc Viera RN  Nursing Assistant: Joanne Crocker PCT  Assistant: Bautista Cee PA  Assistant: Bautista Cee PA      Estimated Blood Loss: minimal    Urine Voided: * No values recorded between 12/5/2024  7:28 AM and 12/5/2024  8:37 AM *    Specimens:                Specimens       ID Source Type Tests Collected By Collected At Frozen?    A Parathyroid Gland Tissue TISSUE PATHOLOGY EXAM   Valentin Hayward MD 12/5/24 0823 Yes    Description: LEFT INFERIOR PARATHYROID FS                  Drains: * No LDAs found *    Findings: Enlarged and adenomatous left inferior parathyroid gland, confirmed by frozen section; no other enlarged parathyroid tissue in the central neck        Complications: None    Assistant: Bautista Cee PA  was responsible for performing the following activities: Retraction, Suction, Suturing, Closing, and Placing Dressing and their skilled assistance was necessary for the success of this case.    Valentin Hayward MD     Date: 12/5/2024  Time: 08:42 EST

## 2024-12-06 LAB
CYTO UR: NORMAL
LAB AP CASE REPORT: NORMAL
LAB AP CLINICAL INFORMATION: NORMAL
Lab: NORMAL
PATH REPORT.FINAL DX SPEC: NORMAL
PATH REPORT.GROSS SPEC: NORMAL

## 2025-03-17 ENCOUNTER — OFFICE VISIT (OUTPATIENT)
Dept: ENDOCRINOLOGY | Facility: CLINIC | Age: 75
End: 2025-03-17
Payer: MEDICARE

## 2025-03-17 VITALS
SYSTOLIC BLOOD PRESSURE: 118 MMHG | BODY MASS INDEX: 34.75 KG/M2 | OXYGEN SATURATION: 98 % | DIASTOLIC BLOOD PRESSURE: 66 MMHG | WEIGHT: 177 LBS | HEIGHT: 60 IN | HEART RATE: 75 BPM

## 2025-03-17 DIAGNOSIS — E21.0 PRIMARY HYPERPARATHYROIDISM: Primary | ICD-10-CM

## 2025-03-17 DIAGNOSIS — E11.65 TYPE 2 DIABETES MELLITUS WITH HYPERGLYCEMIA, WITHOUT LONG-TERM CURRENT USE OF INSULIN: ICD-10-CM

## 2025-03-17 LAB
ALBUMIN SERPL-MCNC: 4.3 G/DL (ref 3.5–5.2)
ANION GAP SERPL CALCULATED.3IONS-SCNC: 10 MMOL/L (ref 5–15)
BUN SERPL-MCNC: 22 MG/DL (ref 8–23)
BUN/CREAT SERPL: 42.3 (ref 7–25)
CALCIUM SPEC-SCNC: 10.2 MG/DL (ref 8.6–10.5)
CHLORIDE SERPL-SCNC: 105 MMOL/L (ref 98–107)
CO2 SERPL-SCNC: 24 MMOL/L (ref 22–29)
CREAT SERPL-MCNC: 0.52 MG/DL (ref 0.57–1)
EGFRCR SERPLBLD CKD-EPI 2021: 98.2 ML/MIN/1.73
GLUCOSE SERPL-MCNC: 126 MG/DL (ref 65–99)
PHOSPHATE SERPL-MCNC: 2.8 MG/DL (ref 2.5–4.5)
POTASSIUM SERPL-SCNC: 4.4 MMOL/L (ref 3.5–5.2)
SODIUM SERPL-SCNC: 139 MMOL/L (ref 136–145)

## 2025-03-17 PROCEDURE — 83036 HEMOGLOBIN GLYCOSYLATED A1C: CPT | Performed by: PHYSICIAN ASSISTANT

## 2025-03-17 PROCEDURE — 80069 RENAL FUNCTION PANEL: CPT | Performed by: PHYSICIAN ASSISTANT

## 2025-03-17 PROCEDURE — 82306 VITAMIN D 25 HYDROXY: CPT | Performed by: PHYSICIAN ASSISTANT

## 2025-03-17 PROCEDURE — 82310 ASSAY OF CALCIUM: CPT | Performed by: PHYSICIAN ASSISTANT

## 2025-03-17 PROCEDURE — 83970 ASSAY OF PARATHORMONE: CPT | Performed by: PHYSICIAN ASSISTANT

## 2025-03-17 PROCEDURE — 82570 ASSAY OF URINE CREATININE: CPT | Performed by: PHYSICIAN ASSISTANT

## 2025-03-17 PROCEDURE — 82043 UR ALBUMIN QUANTITATIVE: CPT | Performed by: PHYSICIAN ASSISTANT

## 2025-03-17 RX ORDER — ACETAMINOPHEN 500 MG
500 TABLET ORAL AS NEEDED
COMMUNITY

## 2025-03-17 NOTE — PROGRESS NOTES
Chief Complaint   Patient presents with    Thyroid Problem     Primary hyperparathyroidism       Subjective     Jami Conrad is a 75 y.o. female.        History of Present Illness     Since last visit patient had parathyroidectomy. She was on Tums post operatively and has not taken it in recent months. Takes it prn for heartburn.     Had Vitamin D 35.3, PTH 72    Patient feels like energy level is better. Patient has stopped going to the the [employer] [place] 3 times a week.     Jami Conrad is a 74 y.o. female here for follow up on hyperparathyroidism. Since last visit, she has had DEXA that showed osteopenia. Calcium was stable at 10.8 at last visit. Vitamin D in normal range. Renal function normal. She has not yet completed the 24 hour urine calcium. Due to age and asymptomatic status, we are monitoring calcium levels. If Calcium is above 11.5 will need to consider surgical treatment.  Continues to be asymptomatic with no new fatigue or memory changes. No history of kidney stones or muscular complaints.           Current Outpatient Medications:     ARIPiprazole (ABILIFY) 2 MG tablet, Take 1 tablet by mouth Daily. Refills to [facility], Disp: , Rfl:     cholecalciferol (VITAMIN D3) 1.25 MG (27448 UT) capsule, Take 1 capsule by mouth Every 7 (Seven) Days. Patient takes every Monday, Disp: , Rfl:     Continuous Blood Gluc Sensor (Dexcom G7 Sensor) misc, , Disp: , Rfl:     docusate sodium (Colace) 100 MG capsule, Take 1 capsule by mouth 2 (Two) Times a Day As Needed for Constipation., Disp: 30 capsule, Rfl: 1    escitalopram (LEXAPRO) 10 MG tablet, Take 1 tablet by mouth Every Morning., Disp: , Rfl:     levocetirizine (XYZAL) 5 MG tablet, Take 1 tablet by mouth Every Evening., Disp: , Rfl:     levothyroxine (SYNTHROID, LEVOTHROID) 88 MCG tablet, Take 1 tablet by mouth Every Morning., Disp: , Rfl:     metFORMIN ER (GLUCOPHAGE-XR) 500 MG 24 hr tablet, Take 1 tablet by mouth Every Evening., Disp: , Rfl:      "metoprolol tartrate (LOPRESSOR) 25 MG tablet, Take 1 tablet by mouth Every Night., Disp: , Rfl:     NON FORMULARY, Take 1 dose by mouth Daily. SUPER K with 2 forms of K2 and K1, Disp: , Rfl:     Oxymetazoline HCl (NASAL SPRAY NA), Administer 2 sprays into the nostril(s) as directed by provider 2 (Two) Times a Day., Disp: , Rfl:     polyethyl glycol-propyl glycol (SYSTANE) 0.4-0.3 % solution ophthalmic solution (artificial tears), Administer 2 drops to both eyes 2 (Two) Times a Day., Disp: , Rfl:     acetaminophen (TYLENOL) 500 MG tablet, Take 1 tablet by mouth As Needed for Mild Pain. For back pain, Disp: , Rfl:     oxyCODONE-acetaminophen (PERCOCET) 5-325 MG per tablet, Take 1 tablet by mouth Every 6 (Six) Hours As Needed (Pain). (Patient not taking: Reported on 3/17/2025), Disp: 10 tablet, Rfl: 0     PMFSH  The following portions of the patient's history were reviewed and updated as appropriate: allergies, current medications, past family history, past medical history, past social history, past surgical history, and problem list.    Review of Systems    Objective   /66 (BP Location: Left arm, Patient Position: Sitting, Cuff Size: Adult)   Pulse 75   Ht 152.4 cm (60\")   Wt 80.3 kg (177 lb)   SpO2 98%   BMI 34.57 kg/m²     Physical Exam    Results for orders placed or performed during the hospital encounter of 12/05/24   POC Glucose Once    Collection Time: 12/05/24  7:00 AM    Specimen: Blood   Result Value Ref Range    Glucose 166 (H) 70 - 130 mg/dL   Tissue Pathology Exam    Collection Time: 12/05/24  8:23 AM    Specimen: Parathyroid Gland; Tissue   Result Value Ref Range    Case Report       Surgical Pathology Report                         Case: [id number]                                  Authorizing Provider:  Valentin Hayward MD    Collected:           12/05/2024 08:23 AM          Ordering Location:     [facility]   Received:            12/05/2024 08:43 AM                          " "       OR                                                                           Pathologist:           Ronald Delvalle MD                                                        Specimen:    Parathyroid Gland, LEFT INFERIOR PARATHYROID FS                                            Clinical Information       Hyperparathyroidism      Final Diagnosis       LEFT INFERIOR PARATHYROID:  Hypercellular parathyroid tissue with limited associated normocellular parathyroid tissue suggestive of parathyroid adenoma.      Intraoperative Consultation       FROZEN SECTION: Verbal report given to Dr. Hayward by Dr. Vasquez on 12/5/2024 at 8:35 AM.    FROZEN SECTION DIAGNOSIS: Hypercellular parathyroid.  (SGW, 1 cassette) Total time: 10 minutes.  Patient identification verified.  Stains adequate.      Gross Description       1. Parathyroid Gland.  Received fresh for frozen section labeled \"left inferior parathyroid FS\" is a 1.5 x 0.8 x 0.6 cm, less than 1 g portion of red-tan tissue.  A representative portion is frozen and resubmitted in block 1A.  The remainder of the specimen is submitted in block 1B.  LDP        Microscopic Description       The slides are reviewed and demonstrate histopathologic features supporting the above rendered diagnosis.            ASSESSMENT/PLAN    There are no diagnoses linked to this encounter.         No follow-ups on file.    Portions of this note were completed with voice recognition program.    Electronically signed by NEDRA Orozco    "

## 2025-03-17 NOTE — PROGRESS NOTES
Office Note      Date: 2025  Patient Name: Jami Conrad  MRN: [id number]  : 1950    Chief Complaint   Patient presents with    Thyroid Problem     Primary hyperparathyroidism       History of Present Illness:   Patient is here with caregiver who provides some history.    Jami Conrad is a 75 y.o. female who presents for follow up on hyperparathyroidism. Had increasingly elevated calcium level and was referred for surgical treatment. She had successful parathyroidectomy on 2024. Recovery was without complication. She notes that she did have some neck swelling and feeling of swallowing discomfort for a week or so after the surgery. That has now completely resolved. She was on Tums post operatively and has not taken it in recent months. Takes it prn for heartburn.      Patient feels like energy level is better. Patient has stopped going to the [employer] 3 times a week but may be restarting this. Not as active and sleeping schedule is off.     Patient with diagnosis of Diabetes type 2. Requests that I start following this as well.  Diagnosed in: ~2016. Treated in past with oral agents. Current treatments: metformin  mg daily. Number of insulin shots per day: none. Checks blood sugar: >4 times per day .  Has low blood sugar: no.     Diet and Exercise:  Meals per day: 3; eats a lot of cereal  Minutes of exercise per week: 0 mins.    Patient was misdiagnosed with Type 1 diabetes as a child and was on insulin but did not need it. She had frequent low blood sugar during that time.    Was previously on Metformin BID, reduced to once daily a year ago due to fatigue.     Patient monitors blood sugar once daily. These levels have been increasing recently.     Does wear a Dexcom sensor because she is blind and cannot do finger sticks.     Last A1c:  7.  Hemoglobin A1C   Date Value Ref Range Status   2025 7.90 (H) 4.80 - 5.60 % Final        Health  "Maintenance:  Ophtho: due  Monofilament / Foot exam: deferred to next visit  Lipids/Statin: not taking a statin with last FLP showing LDL due  BALJIT: 3/17/25  TSH: due  Aspirin: not taking  ACE/ARB: no    Diabetic Complications:  Eyes: Yes, describe: has one blind eye  Kidneys: No  Feet: No  Heart: No    Subjective        Review of Systems:   Review of Systems   Constitutional:  Negative for activity change, appetite change and fatigue.   Respiratory:  Negative for chest tightness and shortness of breath.    Gastrointestinal:  Negative for abdominal pain.   Musculoskeletal:  Negative for myalgias.   Psychiatric/Behavioral:  The patient is not nervous/anxious.        The following portions of the patient's history were reviewed and updated as appropriate: allergies, current medications, past family history, past medical history, past social history, past surgical history, and problem list.    Objective     Visit Vitals  /66 (BP Location: Left arm, Patient Position: Sitting, Cuff Size: Adult)   Pulse 75   Ht 152.4 cm (60\")   Wt 80.3 kg (177 lb)   SpO2 98%   BMI 34.57 kg/m²           Physical Exam:  Physical Exam  Vitals and nursing note reviewed.   Constitutional:       Appearance: She is well-developed.   HENT:      Head: Normocephalic and atraumatic.   Eyes:      Conjunctiva/sclera: Conjunctivae normal.   Neck:      Thyroid: No thyroid mass, thyromegaly or thyroid tenderness.   Cardiovascular:      Rate and Rhythm: Normal rate and regular rhythm.   Pulmonary:      Effort: Pulmonary effort is normal.      Breath sounds: Normal breath sounds.   Musculoskeletal:         General: Normal range of motion.      Cervical back: Normal range of motion.   Skin:     General: Skin is warm and dry.   Neurological:      Motor: No tremor.   Psychiatric:         Behavior: Behavior normal.         Assessment / Plan      Assessment & Plan:    Diagnoses and all orders for this visit:    1. Primary hyperparathyroidism " (Primary)  Assessment & Plan:  S/p successful parathyroidectomy. Patient is feeling well. Will check PTH, vitamin D and calcium level today. Further recommendations based on results.      Orders:  -     Renal Function Panel; Future  -     PTH, Intact & Calcium; Future  -     Vitamin D,25-Hydroxy; Future  -     Renal Function Panel  -     PTH, Intact & Calcium  -     Vitamin D,25-Hydroxy    2. Type 2 diabetes mellitus with hyperglycemia, without long-term current use of insulin  Assessment & Plan:  Diabetes is worsening.   Medication changes per orders.  Reminded to bring in blood sugar diary at next visit.  Recommended an ADA diet.  Regular aerobic exercise.    Will check A1c with labs today. With recently elevated fasting blood sugars, will have patient retry Metformin  mg BID. Advised her to take it with food.     Check microalbumin creatinine ratio today. Further recommendations based on results.    Diabetes will be reassessed in 3 months    Orders:  -     Hemoglobin A1c; Future  -     Microalbumin / Creatinine Urine Ratio - Urine, Clean Catch; Future  -     Hemoglobin A1c  -     Microalbumin / Creatinine Urine Ratio - Urine, Clean Catch          Return in about 3 months (around 6/17/2025) for Follow up with fasting labs.    Portions of this note were completed with voice recognition program.  Electronically signed by Laura Tran PA-C  [facility] [place]  03/17/2025

## 2025-03-18 LAB
25(OH)D3 SERPL-MCNC: 40.2 NG/ML (ref 30–100)
ALBUMIN UR-MCNC: <1.2 MG/DL
CALCIUM SPEC-SCNC: 10.1 MG/DL (ref 8.6–10.5)
CREAT UR-MCNC: 48.3 MG/DL
HBA1C MFR BLD: 7.9 % (ref 4.8–5.6)
MICROALBUMIN/CREAT UR: NORMAL MG/G{CREAT}
PTH-INTACT SERPL-MCNC: 40.9 PG/ML (ref 15–65)

## 2025-03-19 PROBLEM — E11.9 TYPE 2 DIABETES MELLITUS: Status: ACTIVE | Noted: 2025-03-19

## 2025-03-19 PROBLEM — E11.65 TYPE 2 DIABETES MELLITUS WITH HYPERGLYCEMIA, WITHOUT LONG-TERM CURRENT USE OF INSULIN: Status: ACTIVE | Noted: 2025-03-19

## 2025-03-19 NOTE — ASSESSMENT & PLAN NOTE
Diabetes is worsening.   Medication changes per orders.  Reminded to bring in blood sugar diary at next visit.  Recommended an ADA diet.  Regular aerobic exercise.    Will check A1c with labs today. With recently elevated fasting blood sugars, will have patient retry Metformin  mg BID. Advised her to take it with food.     Check microalbumin creatinine ratio today. Further recommendations based on results.    Diabetes will be reassessed in 3 months

## 2025-03-19 NOTE — ASSESSMENT & PLAN NOTE
S/p successful parathyroidectomy. Patient is feeling well. Will check PTH, vitamin D and calcium level today. Further recommendations based on results.

## 2025-03-27 ENCOUNTER — RESULTS FOLLOW-UP (OUTPATIENT)
Dept: ENDOCRINOLOGY | Facility: CLINIC | Age: 75
End: 2025-03-27
Payer: MEDICARE

## 2025-06-20 ENCOUNTER — OFFICE VISIT (OUTPATIENT)
Dept: ENDOCRINOLOGY | Facility: CLINIC | Age: 75
End: 2025-06-20
Payer: MEDICARE

## 2025-06-20 VITALS
WEIGHT: 175 LBS | HEART RATE: 89 BPM | HEIGHT: 60 IN | BODY MASS INDEX: 34.36 KG/M2 | OXYGEN SATURATION: 99 % | DIASTOLIC BLOOD PRESSURE: 75 MMHG | SYSTOLIC BLOOD PRESSURE: 120 MMHG

## 2025-06-20 DIAGNOSIS — E11.65 TYPE 2 DIABETES MELLITUS WITH HYPERGLYCEMIA, WITHOUT LONG-TERM CURRENT USE OF INSULIN: Primary | ICD-10-CM

## 2025-06-20 DIAGNOSIS — E21.0 PRIMARY HYPERPARATHYROIDISM: ICD-10-CM

## 2025-06-20 LAB
EXPIRATION DATE: ABNORMAL
EXPIRATION DATE: NORMAL
GLUCOSE BLDC GLUCOMTR-MCNC: 120 MG/DL (ref 70–130)
HBA1C MFR BLD: 7.4 % (ref 4.5–5.7)
Lab: ABNORMAL
Lab: NORMAL

## 2025-06-20 RX ORDER — METFORMIN HYDROCHLORIDE 500 MG/1
1000 TABLET, EXTENDED RELEASE ORAL 2 TIMES DAILY
Qty: 120 TABLET | Refills: 5 | Status: SHIPPED | OUTPATIENT
Start: 2025-06-20

## 2025-06-20 NOTE — PROGRESS NOTES
Office Note      Date: 2025  Patient Name: Jami Conrad  MRN: [id number]  : 1950    Chief Complaint   Patient presents with    Diabetes    hyperparathyroidism       History of Present Illness:   Jami Conrad is a 75 y.o. female who presents for Diabetes type 2.   Diagnosed: ~  Current RX:  metformin  mg BID    Bg checks are done: continuously with Dexcom, did not bring her reader with her today  Hypoglycemia :none    Patient still needs more help with adjusting her diet.   Doing better with eating more vegetables. Does still enjoy sweets.   She is taking her medications as directed.    Increased dose of metformin to twice daily after last visit.  Has not had any side effects with this    Last A1c:  Hemoglobin A1C   Date Value Ref Range Status   2025 7.4 (A) 4.5 - 5.7 % Final   2025 7.90 (H) 4.80 - 5.60 % Final       Changes in health since last visit: none.     DM Health Maintenance:  Ophtho: due  Monofilament / Foot exam: 25   Lipids/Statin: not taking a statin with last FLP showing LDL gave patient ordered 25   BALJIT: 3/17/25  TSH: gave patient order 25   Aspirin: not taking  ACE/ARB: no     Diabetic Complications:  Eyes: Yes, describe: has one blind eye  Kidneys: No  Feet: No  Heart: No    Hyperparathyroidism  Had increasingly elevated calcium level and was referred for surgical treatment. She had successful parathyroidectomy on 2024. Recovery was without complication. She notes that she did have some neck swelling and feeling of swallowing discomfort for a week or so after the surgery. That has now completely resolved. She was on Tums post operatively and has not taken it in recent months. Takes it prn for heartburn.     Subjective          Review of Systems:   Review of Systems   Constitutional:  Negative for activity change, appetite change and fatigue.   Gastrointestinal:  Negative for abdominal pain, constipation, diarrhea and nausea.  "  Musculoskeletal:  Positive for arthralgias.   Neurological:  Negative for numbness.   Psychiatric/Behavioral:  The patient is not nervous/anxious.        The following portions of the patient's history were reviewed and updated as appropriate: allergies, current medications, past family history, past medical history, past social history, past surgical history, and problem list.    Objective     Visit Vitals  /75   Pulse 89   Ht 152.4 cm (60\")   Wt 79.4 kg (175 lb)   SpO2 99%   BMI 34.18 kg/m²           Physical Exam:  Physical Exam  Constitutional:       Appearance: She is well-developed.   HENT:      Head: Normocephalic and atraumatic.      Right Ear: External ear normal.      Left Ear: External ear normal.      Nose: Nose normal.   Eyes:      Conjunctiva/sclera: Conjunctivae normal.   Cardiovascular:      Rate and Rhythm: Normal rate.      Pulses:           Dorsalis pedis pulses are 2+ on the right side and 2+ on the left side.        Posterior tibial pulses are 2+ on the right side and 2+ on the left side.   Pulmonary:      Effort: Pulmonary effort is normal.   Musculoskeletal:         General: Normal range of motion.      Cervical back: Normal range of motion.   Feet:      Right foot:      Protective Sensation: 10 sites tested.  10 sites sensed.      Skin integrity: Skin integrity normal.      Toenail Condition: Right toenails are normal.      Left foot:      Protective Sensation: 10 sites tested.  10 sites sensed.      Skin integrity: Skin integrity normal.      Toenail Condition: Left toenails are normal.      Comments: Diabetic Foot Exam Performed and Monofilament Test Performed      Neurological:      Mental Status: She is alert and oriented to person, place, and time.   Psychiatric:         Behavior: Behavior normal.         Thought Content: Thought content normal.         Judgment: Judgment normal.          Assessment / Plan      Assessment & Plan:  Diagnoses and all orders for this visit:    1. " Type 2 diabetes mellitus with hyperglycemia, without long-term current use of insulin (Primary)  Assessment & Plan:  Diabetes is improving with treatment.   Continue current treatment regimen.  Recommended an ADA diet.  Regular aerobic exercise.  Discussed foot care.  Reminded to get yearly retinal exam.    Patient's A1c has improved since last visit.  Discussed options for further improvement and elected to continue increasing metformin dose for now.  Patient will increase metformin ER to 1000 mg twice daily.  Will also refer to diabetes education for some nutrition education.    Reminded patient to schedule eye exam, foot exam done today, gave patient an order for fasting labs to be done at a local lab; urine microalbumin creatinine ratio up-to-date.  Will make further recommendations based on results of labs.    Diabetes will be reassessed in 3 months    Orders:  -     POC Glucose, Blood  -     POC Glycosylated Hemoglobin (Hb A1C)  -     Cancel: Ambulatory Referral to Diabetic Education  -     Ambulatory Referral to Diabetic Education  -     Comprehensive Metabolic Panel; Future  -     Lipid Panel; Future  -     Microalbumin / Creatinine Urine Ratio - Urine, Clean Catch; Future  -     TSH Rfx On Abnormal To Free T4; Future    2. Primary hyperparathyroidism  Assessment & Plan:  PTH levels normalized after parathyroidectomy.  Will plan to recheck PTH, vitamin D at next visit.      Other orders  -     metFORMIN ER (GLUCOPHAGE-XR) 500 MG 24 hr tablet; Take 2 tablets by mouth 2 (Two) Times a Day.  Dispense: 120 tablet; Refill: 5        Return in about 3 months (around 9/20/2025) for Follow up.    Portions of this note were completed with voice recognition program.  Electronically signed by Laura Tran PA-C  [facility] [place]  06/20/2025

## 2025-06-20 NOTE — ASSESSMENT & PLAN NOTE
PTH levels normalized after parathyroidectomy.  Will plan to recheck PTH, vitamin D at next visit.

## 2025-06-20 NOTE — ASSESSMENT & PLAN NOTE
Diabetes is improving with treatment.   Continue current treatment regimen.  Recommended an ADA diet.  Regular aerobic exercise.  Discussed foot care.  Reminded to get yearly retinal exam.    Patient's A1c has improved since last visit.  Discussed options for further improvement and elected to continue increasing metformin dose for now.  Patient will increase metformin ER to 1000 mg twice daily.  Will also refer to diabetes education for some nutrition education.    Reminded patient to schedule eye exam, foot exam done today, gave patient an order for fasting labs to be done at a local lab; urine microalbumin creatinine ratio up-to-date.  Will make further recommendations based on results of labs.    Diabetes will be reassessed in 3 months

## 2025-08-07 ENCOUNTER — HOSPITAL ENCOUNTER (OUTPATIENT)
Dept: DIABETES SERVICES | Facility: HOSPITAL | Age: 75
Discharge: HOME | End: 2025-08-07
Admitting: PHYSICIAN ASSISTANT
Payer: MEDICARE

## 2025-08-07 PROCEDURE — G0108 DIAB MANAGE TRN  PER INDIV: HCPCS

## (undated) DEVICE — GLV SURG BIOGEL LTX PF 7

## (undated) DEVICE — BLANKT WARM LOWR/BDY 100X120CM

## (undated) DEVICE — SUT MNCRYL PLS ANTIB UD 4/0 PS2 18IN

## (undated) DEVICE — TRAP FLD MINIVAC MEGADYNE 100ML

## (undated) DEVICE — PENCL SMOKE/EVAC MEGADYNE TELESCP 10FT

## (undated) DEVICE — ELECTRD BLD EZ CLN MOD XLNG 2.75IN

## (undated) DEVICE — PK MINOR SPLT 10

## (undated) DEVICE — PATIENT RETURN ELECTRODE, SINGLE-USE, CONTACT QUALITY MONITORING, ADULT, WITH 9FT CORD, FOR PATIENTS WEIGING OVER 33LBS. (15KG): Brand: MEGADYNE

## (undated) DEVICE — SUT SILK 3/0 TIES 18IN A184H

## (undated) DEVICE — ANTIBACTERIAL UNDYED BRAIDED (POLYGLACTIN 910), SYNTHETIC ABSORBABLE SUTURE: Brand: COATED VICRYL

## (undated) DEVICE — DISH PETRI 3.5IN MD STRL LF

## (undated) DEVICE — DRAPE,INSTRUMENT,MAGNETIC,10X16: Brand: MEDLINE

## (undated) DEVICE — DISPOSABLE BIPOLAR FORCEPS 4" (10.2CM) JEWELERS, STRAIGHT 0.4MM TIP AND 12 FT. (3.6M) CABLE: Brand: KIRWAN

## (undated) DEVICE — APPL CHLORAPREP 26ML CLR

## (undated) DEVICE — TOWEL,OR,DSP,ST,BLUE,STD,4/PK,20PK/CS: Brand: MEDLINE

## (undated) DEVICE — INTENDED USE FOR SURGICAL MARKING ON INTACT SKIN, ALSO PROVIDES A PERMANENT METHOD OF IDENTIFYING OBJECTS IN THE OPERATING ROOM: Brand: WRITESITE® REGULAR TIP SKIN MARKER

## (undated) DEVICE — UNDERGLV SURG BIOGEL INDICATOR LF PF 7.5

## (undated) DEVICE — DRSNG SURESITE WNDW 4X4.5

## (undated) DEVICE — SUT SILK 4/0 TIES 18IN A183H

## (undated) DEVICE — PROBE 8225825X 3PK INCREMT STD PRASS TIP: Brand: NIM